# Patient Record
Sex: MALE | Race: WHITE
[De-identification: names, ages, dates, MRNs, and addresses within clinical notes are randomized per-mention and may not be internally consistent; named-entity substitution may affect disease eponyms.]

---

## 2021-08-30 ENCOUNTER — HOSPITAL ENCOUNTER (INPATIENT)
Dept: HOSPITAL 47 - EC | Age: 61
LOS: 1 days | Discharge: HOME | DRG: 694 | End: 2021-08-31
Attending: HOSPITALIST | Admitting: HOSPITALIST
Payer: MEDICARE

## 2021-08-30 DIAGNOSIS — K57.30: ICD-10-CM

## 2021-08-30 DIAGNOSIS — Z79.82: ICD-10-CM

## 2021-08-30 DIAGNOSIS — Z86.73: ICD-10-CM

## 2021-08-30 DIAGNOSIS — F17.210: ICD-10-CM

## 2021-08-30 DIAGNOSIS — Z87.442: ICD-10-CM

## 2021-08-30 DIAGNOSIS — F31.9: ICD-10-CM

## 2021-08-30 DIAGNOSIS — K80.20: ICD-10-CM

## 2021-08-30 DIAGNOSIS — N50.812: ICD-10-CM

## 2021-08-30 DIAGNOSIS — H54.61: ICD-10-CM

## 2021-08-30 DIAGNOSIS — F41.9: ICD-10-CM

## 2021-08-30 DIAGNOSIS — R31.0: ICD-10-CM

## 2021-08-30 DIAGNOSIS — E66.9: ICD-10-CM

## 2021-08-30 DIAGNOSIS — F43.10: ICD-10-CM

## 2021-08-30 DIAGNOSIS — N13.2: Primary | ICD-10-CM

## 2021-08-30 DIAGNOSIS — D72.829: ICD-10-CM

## 2021-08-30 LAB
ALBUMIN SERPL-MCNC: 4.5 G/DL (ref 3.5–5)
ALP SERPL-CCNC: 79 U/L (ref 38–126)
ALT SERPL-CCNC: 34 U/L (ref 4–49)
AMYLASE SERPL-CCNC: 45 U/L (ref 30–110)
ANION GAP SERPL CALC-SCNC: 8 MMOL/L
AST SERPL-CCNC: 42 U/L (ref 17–59)
BASOPHILS # BLD AUTO: 0 K/UL (ref 0–0.2)
BASOPHILS NFR BLD AUTO: 0 %
BUN SERPL-SCNC: 20 MG/DL (ref 9–20)
CALCIUM SPEC-MCNC: 9.9 MG/DL (ref 8.4–10.2)
CHLORIDE SERPL-SCNC: 111 MMOL/L (ref 98–107)
CO2 SERPL-SCNC: 20 MMOL/L (ref 22–30)
EOSINOPHIL # BLD AUTO: 0.1 K/UL (ref 0–0.7)
EOSINOPHIL NFR BLD AUTO: 1 %
ERYTHROCYTE [DISTWIDTH] IN BLOOD BY AUTOMATED COUNT: 5.03 M/UL (ref 4.3–5.9)
ERYTHROCYTE [DISTWIDTH] IN BLOOD: 13.9 % (ref 11.5–15.5)
GLUCOSE SERPL-MCNC: 134 MG/DL (ref 74–99)
HCT VFR BLD AUTO: 48.9 % (ref 39–53)
HGB BLD-MCNC: 16.8 GM/DL (ref 13–17.5)
LIPASE SERPL-CCNC: 46 U/L (ref 23–300)
LYMPHOCYTES # SPEC AUTO: 0.8 K/UL (ref 1–4.8)
LYMPHOCYTES NFR SPEC AUTO: 7 %
MCH RBC QN AUTO: 33.4 PG (ref 25–35)
MCHC RBC AUTO-ENTMCNC: 34.4 G/DL (ref 31–37)
MCV RBC AUTO: 97.2 FL (ref 80–100)
MONOCYTES # BLD AUTO: 0.8 K/UL (ref 0–1)
MONOCYTES NFR BLD AUTO: 6 %
NEUTROPHILS # BLD AUTO: 10.2 K/UL (ref 1.3–7.7)
NEUTROPHILS NFR BLD AUTO: 85 %
PH UR: 5.5 [PH] (ref 5–8)
PLATELET # BLD AUTO: 173 K/UL (ref 150–450)
POTASSIUM SERPL-SCNC: 4.2 MMOL/L (ref 3.5–5.1)
PROT SERPL-MCNC: 7 G/DL (ref 6.3–8.2)
RBC UR QL: 28 /HPF (ref 0–5)
SODIUM SERPL-SCNC: 139 MMOL/L (ref 137–145)
SP GR UR: 1.02 (ref 1–1.03)
SQUAMOUS UR QL AUTO: <1 /HPF (ref 0–4)
UROBILINOGEN UR QL STRIP: <2 MG/DL (ref ?–2)
WBC # BLD AUTO: 11.9 K/UL (ref 3.8–10.6)
WBC #/AREA URNS HPF: 3 /HPF (ref 0–5)

## 2021-08-30 PROCEDURE — 99285 EMERGENCY DEPT VISIT HI MDM: CPT

## 2021-08-30 PROCEDURE — 96375 TX/PRO/DX INJ NEW DRUG ADDON: CPT

## 2021-08-30 PROCEDURE — 80053 COMPREHEN METABOLIC PANEL: CPT

## 2021-08-30 PROCEDURE — 81001 URINALYSIS AUTO W/SCOPE: CPT

## 2021-08-30 PROCEDURE — 82150 ASSAY OF AMYLASE: CPT

## 2021-08-30 PROCEDURE — 96374 THER/PROPH/DIAG INJ IV PUSH: CPT

## 2021-08-30 PROCEDURE — 36415 COLL VENOUS BLD VENIPUNCTURE: CPT

## 2021-08-30 PROCEDURE — 74177 CT ABD & PELVIS W/CONTRAST: CPT

## 2021-08-30 PROCEDURE — 83690 ASSAY OF LIPASE: CPT

## 2021-08-30 PROCEDURE — 85025 COMPLETE CBC W/AUTO DIFF WBC: CPT

## 2021-08-30 NOTE — CT
EXAMINATION TYPE: CT abdomen pelvis w con

 

DATE OF EXAM: 8/30/2021

 

COMPARISON: None

 

HISTORY: Abdominal pain and nausea.

 

CT DLP: 1776.6 mGycm

Automated exposure control for dose reduction was used.

 

CONTRAST: 

Performed with IV Contrast, patient injected with 100 mL of Isovue 300.

 

The lung bases are clear of consolidation. There is no pleural effusion. Heart size is normal. There 
is no pericardial effusion. Stomach is intact. Liver and spleen and pancreas appear intact. The bile 
ducts are not dilated. There are several large calcified gallstones. Gallbladder has normal size.

 

There is no adrenal mass. Kidneys have normal size. There is left-sided hydronephrosis and delayed le
ft side pyelogram. There is mild left-sided hydroureter. There is 5 mm obstructing calculus distal le
ft ureter. Right kidney shows normal excretion on the delayed images. There is no retroperitoneal geoffrey
nopathy. Bladder distends smoothly. There is no inguinal hernia. There is no free fluid in the pelvis
. 

 

There are some sigmoid diverticula. I see no sign of diverticulitis. Appendix is lateral and posterio
r and appears normal. There is no mesenteric edema. There is no ascites or free air. There is no juliana
l obstruction. The lumbar vertebra have normal alignment. There is large posterior calcification of t
he endplates at L5-S1. There is developmentally adequate spinal canal and no significant spinal steno
sis. There is no lumbar compression fracture. The bony pelvis is intact. The hip joints are intact.

 

IMPRESSION:

Obstructing calculus lower left ureter with left-sided hydronephrosis and hydroureter. The

 

Normal appendix. Large calcified gallstones. No dilated ducts. Sigmoid diverticulosis.

## 2021-08-30 NOTE — ED
General Adult HPI





- General


Chief complaint: Abdominal Pain


Stated complaint: Male , vomiting


Time Seen by Provider: 08/30/21 16:10


Source: patient, RN notes reviewed, old records reviewed


Mode of arrival: ambulatory


Limitations: no limitations





- History of Present Illness


Initial comments: 





I evaluated the patient was placed in a room.  Workup was started in the waiting

room by nursing staff.  Patient is a 60-year-old male with past medical history 

remarkable for CVA, TIA with no residual deficits other than blindness in the 

right eye as well as a remote history of renal stones presents emergency 

Department complaining of left flank plain.  She states that this started today.

 He describes it as a sharp sensation in his left flank that radiates up to his 

left lower back as well as his left groin.  This is similar to prior episodes of

nephrolithiasis which he has not experienced in some years.  He is endorsing 

some nausea as well as nonbilious, bloody emesis.  Denies any change in bowel 

habits, and is still experiencing normal flatus.  Denies any dysuria but does 

endorse hematuria.  Denies any fevers or chills.  Denies any chest pain, s

hortness of breath.  Denies any sick contacts.  He still able to urinate.  His 

no other acute complaints at this time.





- Related Data


                                Home Medications











 Medication  Instructions  Recorded  Confirmed


 


Aspirin EC [Ecotrin Low Dose] 81 mg PO DAILY 08/30/21 08/30/21











                                    Allergies











Allergy/AdvReac Type Severity Reaction Status Date / Time


 


No Known Allergies Allergy   Verified 08/30/21 18:46














Review of Systems


ROS Statement: 


Those systems with pertinent positive or pertinent negative responses have been 

documented in the HPI.


Review of Systems:


CONST: Denies fever 


EYES: Denies blurry vision 


ENT: Denies nasal congestion  


C/V:  Denies Chest pain


RESP: Denies shortness of breath 


GI: Endorses abdominal pain/flank pain


: Denies dysuria  


SKIN: Denies rash.


MSK: Denies joint pain.


NEURO: Denies headache 


ROS Other: All systems not noted in ROS Statement are negative.





Past Medical History


Past Medical History: CVA/TIA


Past Surgical History: Orthopedic Surgery


Past Psychological History: Anxiety, Bipolar, Depression, PTSD


Smoking Status: Current every day smoker


Past Alcohol Use History: Rare


Past Drug Use History: None Reported





General Exam





- General Exam Comments


Initial Comments: 





General: Appears in no acute distress.


HEAD:  Normal with no signs of head trauma.


EYES:  PERRLA, EOMI, conjunctiva normal, no discharge.


ENT:  Hearing grossly intact, normal oropharynx.


RESPIRATORY:  Clear breath sounds bilaterally.  No wheezes, rales, or rhonchi.  


C/V:  Regular rate and rhythm. S1 and S2 auscultated, no edema, peripheral 

pulses 2+ and intact throughout


ABD: Abdomen is soft, nondistended.  Patient has left-sided flank tenderness to 

palpation with radiation to his left groin.  His no left lower quadrant 

tenderness palpation.  There is no rebound wrist peritoneal signs.  Patient does

have left CVA tenderness to percussion.


EXT: Normal range of motion, no obvious deformity


SKIN:  No rashes or lesions observed on exposed skin.


NEURO: Alert and oriented 4.


Limitations: no limitations





Course


                                   Vital Signs











  08/30/21 08/30/21 08/30/21





  14:24 18:19 20:00


 


Temperature 98.1 F 97.7 F 


 


Pulse Rate 58 L 55 L 130 H


 


Respiratory 18 18 





Rate   


 


Blood Pressure 189/84 177/76 


 


O2 Sat by Pulse 95 95 





Oximetry   














Medical Decision Making





- Medical Decision Making





Based on patient's presentation and physical exam, I'm concerned for was likely 

nephrolithiasis and possible hydronephrosis.  Laboratory studies were done by 

nursing staff revealed a mild leukocytosis of 11.9 which could be reactive.  

Patient is mildly deep bicarb at 20.  Patient's urinalysis is relatively u

nremarkable except for hematuria.  Is not impressive for infection at this time.

 Addition to the workup performed by nursing staff, did recommend that we obtain

a CT abdomen and pelvis due to his pain.  He was in agreement this plan.  Also 

be given a 1 L fluid bolus as well as IV morphine, Toradol, Zofran.  Patient was

in agreement this plan.





Patient's CT imaging revealed a 5 mm nephrolithiasis in the distal left ureter. 

There are also signs of hydronephrosis.  There is some sigmoid diverticula as 

well without diverticulitis.  There are calcified gallstones as well.  Remaining

a CT is relatively unremarkable.





On reevaluation, patient still complaining of pain as well as nausea and vomiti

ng.  I did recommend that we admit him to the hospital for pain control, nausea 

and vomiting control, as well as evaluation by urology due to his 

nephrolithiasis.  He was in agreement this plan.





I consulted urology and spoke with the attending on call, Dr. Garcia was in 

agreement with the plan.  Patient has no PCP and will therefore be admitted to 

city call, and I spoke with the mid-level provider Randal Soriano who accepted the

patient.  Patient was therefore admitted to the hospital and serous condition.





- Lab Data


Result diagrams: 


                                 08/30/21 14:32





                                 08/30/21 14:32


                                   Lab Results











  08/30/21 08/30/21 08/30/21 Range/Units





  14:32 14:32 14:32 


 


WBC  11.9 H    (3.8-10.6)  k/uL


 


RBC  5.03    (4.30-5.90)  m/uL


 


Hgb  16.8    (13.0-17.5)  gm/dL


 


Hct  48.9    (39.0-53.0)  %


 


MCV  97.2    (80.0-100.0)  fL


 


MCH  33.4    (25.0-35.0)  pg


 


MCHC  34.4    (31.0-37.0)  g/dL


 


RDW  13.9    (11.5-15.5)  %


 


Plt Count  173    (150-450)  k/uL


 


MPV  8.9    


 


Neutrophils %  85    %


 


Lymphocytes %  7    %


 


Monocytes %  6    %


 


Eosinophils %  1    %


 


Basophils %  0    %


 


Neutrophils #  10.2 H    (1.3-7.7)  k/uL


 


Lymphocytes #  0.8 L    (1.0-4.8)  k/uL


 


Monocytes #  0.8    (0-1.0)  k/uL


 


Eosinophils #  0.1    (0-0.7)  k/uL


 


Basophils #  0.0    (0-0.2)  k/uL


 


Sodium    139  (137-145)  mmol/L


 


Potassium    4.2  (3.5-5.1)  mmol/L


 


Chloride    111 H  ()  mmol/L


 


Carbon Dioxide    20 L  (22-30)  mmol/L


 


Anion Gap    8  mmol/L


 


BUN    20  (9-20)  mg/dL


 


Creatinine    1.16  (0.66-1.25)  mg/dL


 


Est GFR (CKD-EPI)AfAm    79  (>60 ml/min/1.73 sqM)  


 


Est GFR (CKD-EPI)NonAf    68  (>60 ml/min/1.73 sqM)  


 


Glucose    134 H  (74-99)  mg/dL


 


Calcium    9.9  (8.4-10.2)  mg/dL


 


Total Bilirubin    0.5  (0.2-1.3)  mg/dL


 


AST    42  (17-59)  U/L


 


ALT    34  (4-49)  U/L


 


Alkaline Phosphatase    79  ()  U/L


 


Total Protein    7.0  (6.3-8.2)  g/dL


 


Albumin    4.5  (3.5-5.0)  g/dL


 


Amylase    45  ()  U/L


 


Lipase    46  ()  U/L


 


Urine Color   Yellow   


 


Urine Appearance   Cloudy   (Clear)  


 


Urine pH   5.5   (5.0-8.0)  


 


Ur Specific Gravity   1.020   (1.001-1.035)  


 


Urine Protein   Trace H   (Negative)  


 


Urine Glucose (UA)   Negative   (Negative)  


 


Urine Ketones   Trace H   (Negative)  


 


Urine Blood   Large H   (Negative)  


 


Urine Nitrite   Negative   (Negative)  


 


Urine Bilirubin   Negative   (Negative)  


 


Urine Urobilinogen   <2.0   (<2.0)  mg/dL


 


Ur Leukocyte Esterase   Negative   (Negative)  


 


Urine RBC   28 H   (0-5)  /hpf


 


Urine WBC   3   (0-5)  /hpf


 


Ur Squamous Epith Cells   <1   (0-4)  /hpf


 


Urine Bacteria   Rare H   (None)  /hpf


 


Urine Mucus   Rare H   (None)  /hpf














Disposition


Clinical Impression: 


 Nephrolithiasis, Hydronephrosis, Abdominal pain, Nausea and vomiting





Disposition: ADMITTED AS IP TO THIS [facility]


Condition: Serious

## 2021-08-31 VITALS
RESPIRATION RATE: 18 BRPM | DIASTOLIC BLOOD PRESSURE: 65 MMHG | SYSTOLIC BLOOD PRESSURE: 108 MMHG | HEART RATE: 56 BPM | TEMPERATURE: 97.7 F

## 2021-08-31 RX ADMIN — HEPARIN SODIUM SCH: 5000 INJECTION INTRAVENOUS; SUBCUTANEOUS at 03:20

## 2021-08-31 RX ADMIN — HEPARIN SODIUM SCH: 5000 INJECTION INTRAVENOUS; SUBCUTANEOUS at 08:51

## 2021-08-31 NOTE — P.GSCN
History of Present Illness


Consult date: 08/31/21


Reason for Consult: 


Left renal colic





Requesting physician: Malcolm Harris


History of present illness: 


The patient is a 60-year-old white male who presented to the ER yesterday with 

acute onset of left flank pain radiating to the left groin.  He experienced 

associated nausea, vomiting, and gross hematuria.  A CT scan showed mild left 

hydroureteronephrosis due to a 5 mm left distal ureteral calculus.  He was 

admitted for treatment consisting of IV hydration and parenteral analgesics.  He

is currently comfortable.








Review of Systems





- Constitutional


Denies chills, Denies fever





- Gastrointestinal


Reports nausea, Reports vomiting





- Genitourinary


Reports flank pain, Reports hematuria, Reports kidney stones





Past Medical History


Past Medical History: CVA/TIA


History of Any Multi-Drug Resistant Organisms: None Reported


Past Surgical History: Orthopedic Surgery


Additional Past Surgical History / Comment(s): shoulder


Past Psychological History: Anxiety, Bipolar, Depression, PTSD


Smoking Status: Current every day smoker


Past Alcohol Use History: Rare


Past Drug Use History: None Reported





Medications and Allergies


                                Home Medications











 Medication  Instructions  Recorded  Confirmed  Type


 


Aspirin EC [Ecotrin Low Dose] 81 mg PO DAILY 08/30/21 08/30/21 History


 


Acetaminophen Tab [Tylenol Tab] 500 mg PO Q6H PRN #30 tablet 08/31/21  Rx


 


HYDROcodone/APAP 5-325MG [Norco 1 tab PO Q6HR PRN #10 tab 08/31/21  Rx





5-325]    


 


Ondansetron [Zofran] 4 mg PO Q8HR PRN #12 tab 08/31/21  Rx


 


Tamsulosin [Flomax] 0.4 mg PO PC-BRKFST #30 cap.er.24h 08/31/21  Rx








                                    Allergies











Allergy/AdvReac Type Severity Reaction Status Date / Time


 


No Known Allergies Allergy   Verified 08/30/21 18:46














Surgical - Exam


                                   Vital Signs











Temp Pulse Resp BP Pulse Ox


 


 98.1 F   58 L  18   189/84   95 


 


 08/30/21 14:24  08/30/21 14:24  08/30/21 14:24  08/30/21 14:24  08/30/21 14:24














- General


well developed, well nourished, no distress





- Respiratory


normal respiratory effort





- Abdomen


Abdomen: soft, non tender, no guarding, no rigid, no rebound





- Genitourinary


normal penis with no external lesions, testicles non-tender





- Psychiatric


oriented to time, oriented to person, oriented to place, speech is normal, 

memory intact





Results





- Labs





                                 08/30/21 14:32





                                 08/30/21 14:32


                  Abnormal Lab Results - Last 24 Hours (Table)











  08/30/21 08/30/21 08/30/21 Range/Units





  14:32 14:32 14:32 


 


WBC  11.9 H    (3.8-10.6)  k/uL


 


Neutrophils #  10.2 H    (1.3-7.7)  k/uL


 


Lymphocytes #  0.8 L    (1.0-4.8)  k/uL


 


Chloride    111 H  ()  mmol/L


 


Carbon Dioxide    20 L  (22-30)  mmol/L


 


Glucose    134 H  (74-99)  mg/dL


 


Urine Protein   Trace H   (Negative)  


 


Urine Ketones   Trace H   (Negative)  


 


Urine Blood   Large H   (Negative)  


 


Urine RBC   28 H   (0-5)  /hpf


 


Urine Bacteria   Rare H   (None)  /hpf


 


Urine Mucus   Rare H   (None)  /hpf








                                 Diabetes panel











  08/30/21 Range/Units





  14:32 


 


Sodium  139  (137-145)  mmol/L


 


Potassium  4.2  (3.5-5.1)  mmol/L


 


Chloride  111 H  ()  mmol/L


 


Carbon Dioxide  20 L  (22-30)  mmol/L


 


BUN  20  (9-20)  mg/dL


 


Creatinine  1.16  (0.66-1.25)  mg/dL


 


Glucose  134 H  (74-99)  mg/dL


 


Calcium  9.9  (8.4-10.2)  mg/dL


 


AST  42  (17-59)  U/L


 


ALT  34  (4-49)  U/L


 


Alkaline Phosphatase  79  ()  U/L


 


Total Protein  7.0  (6.3-8.2)  g/dL


 


Albumin  4.5  (3.5-5.0)  g/dL








                                  Calcium panel











  08/30/21 Range/Units





  14:32 


 


Calcium  9.9  (8.4-10.2)  mg/dL


 


Albumin  4.5  (3.5-5.0)  g/dL








                                 Pituitary panel











  08/30/21 Range/Units





  14:32 


 


Sodium  139  (137-145)  mmol/L


 


Potassium  4.2  (3.5-5.1)  mmol/L


 


Chloride  111 H  ()  mmol/L


 


Carbon Dioxide  20 L  (22-30)  mmol/L


 


BUN  20  (9-20)  mg/dL


 


Creatinine  1.16  (0.66-1.25)  mg/dL


 


Glucose  134 H  (74-99)  mg/dL


 


Calcium  9.9  (8.4-10.2)  mg/dL








                                  Adrenal panel











  08/30/21 Range/Units





  14:32 


 


Sodium  139  (137-145)  mmol/L


 


Potassium  4.2  (3.5-5.1)  mmol/L


 


Chloride  111 H  ()  mmol/L


 


Carbon Dioxide  20 L  (22-30)  mmol/L


 


BUN  20  (9-20)  mg/dL


 


Creatinine  1.16  (0.66-1.25)  mg/dL


 


Glucose  134 H  (74-99)  mg/dL


 


Calcium  9.9  (8.4-10.2)  mg/dL


 


Total Bilirubin  0.5  (0.2-1.3)  mg/dL


 


AST  42  (17-59)  U/L


 


ALT  34  (4-49)  U/L


 


Alkaline Phosphatase  79  ()  U/L


 


Total Protein  7.0  (6.3-8.2)  g/dL


 


Albumin  4.5  (3.5-5.0)  g/dL














- Imaging


CT scan - abdomen: report reviewed, image reviewed





Assessment and Plan


(1) Calculus of ureter


Current Visit: Yes   Status: Acute   Code(s): N20.1 - CALCULUS OF URETER   

SNOMED Code(s): 12607865


   





(2) Hydronephrosis with renal and ureteral calculous obstruction


Current Visit: Yes   Status: Acute   Code(s): N13.2 - HYDRONEPHROSIS WITH RENAL 

AND URETERAL CALCULOUS OBSTRUCTION   SNOMED Code(s): 944349572


   


Plan: 


In summary, the patient is a 60-year-old white male admitted with left renal 

colic due to a 5 mm left distal ureteral calculus.  His pain is currently 

controlled.   He passed some blood yesterday evening after being admitted.  His 

urine has not been strained, and therefore it is unclear whether he may or may 

not have passed the calculus.  In any case, his pain is currently absent and he 

wishes to be discharged home.  I have advised him to continue to strain his 

urine, and take analgesics as needed.  We discussed surgical treatment options, 

though I explained to him that there is a high likelihood he will pass the 

calculus.  He will follow-up with me in 2 weeks, sooner if needed.  





Time with Patient: Greater than 30

## 2021-08-31 NOTE — HP
HISTORY AND PHYSICAL



This is a combined history and physical and discharge summary.



DATE OF SERVICE:

08/31/2021



CHIEF COMPLAINTS:

Left flank pain and left testicular pain.



HISTORY OF PRESENT ILLNESS:

This 60-year-old gentleman with a past medical history of multiple medical problems,

including CVA, TIA, history of DJD, history of anxiety, bipolar, depression, PTSD,

being followed by Dr. Lorenzo in the outpatient setting, was  complaining of left flank

pain, left lower abdominal pain as well as left testicular pain.  Evaluation in the ER

showed WBC 11.9 and some mild hematuria.  The patient also had a CT scan of the abdomen

and pelvis which shows obstructing calculus in the lower left ureter and left-sided

hydronephrosis and hydroureter.  Dr. Garcia has evaluated the patient and recommended

Flomax and outpatient followup also.  The patient is keen to go home.  There is no

history of any fever, rigors or chills. No history of headache, loss of consciousness,

seizures. No history of dysuria at this time. No history of chest pain or palpitations.



PAST MEDICAL HISTORY:

History of CVA, TIA, anxiety, bipolar, depression, PTSD.



MEDICATIONS:

Home medications are aspirin, Flomax, Zofran, Norco, Tylenol.



ALLERGIES:

NONE.



FAMILY HISTORY:

No history of heart disease or strokes in the family.



SOCIAL HISTORY:

History of smoking on a daily basis.



REVIEW OF SYSTEMS:

ENT:  No diminished hearing. No diminished vision.

CARDIOVASCULAR SYSTEM: No angina, palpitations.

RESPIRATORY SYSTEM: No cough, hemoptysis.

GI: As mentioned earlier.

:  As mentioned earlier.

NERVOUS SYSTEM: No numbness, weakness.

ALLERGY/IMMUNOLOGY:  No asthma or hay fever.

MUSCULOSKELETAL: As mentioned earlier.

HEMATOLOGY/ONCOLOGY:  No history of anemia.

ENDOCRINE: No history of diabetes, hypothyroidism.

CONSTITUTIONAL: As mentioned earlier.

DERMATOLOGY:  Negative.

RHEUMATOLOGY: Negative.

PSYCHIATRY: As mentioned earlier.



PHYSICAL EXAMINATION:

Patient alert and oriented x3. Pulse 56, blood pressure 108/65, respiration 18,

temperature 97.7, pulse ox 97% on room air.

HEENT: Conjunctivae normal.

NECK: No jugular venous distention.

CARDIOVASCULAR: S1, S2 muffled.

RESPIRATION: Breath sounds diminished at the bases.  No rhonchi. No crackles.

ABDOMEN: Soft. No tenderness. No guarding.  No rigidity.  No mass palpable.

LEGS: No edema. No swelling.

NERVOUS SYSTEM: Higher functions as mentioned earlier. Moves all 4 limbs.  No focal

motor or sensory deficit.

LYMPHATICS: No lymph node palpable in neck, axillae or groin.

SKIN: No ulcer, rash, bleeding.

JOINTS: No active deforming arthropathy.



LABS:

WBC 11.9, sodium 136, potassium 4.2. Glucose 134.  UA noted.



ASSESSMENT:

1. Left-sided ureterolithiasis with severe abdominal pain, improved.

2. Obstructing calculus in the lower left ureter with left-sided hydronephrosis and

    hydroureter.

3. Increased white count, possibly reactive.

4. Increased random blood glucose.

5. History of cerebrovascular accident, transient ischemic attack.

6. History of anxiety, bipolar, depression, posttraumatic stress disorder.

7. History of nicotine dependence.

8. Obesity with body mass index of 34.5.

9. FULL CODE.



RECOMMENDATIONS AND DISCUSSION:

In this 60-year-old gentleman who presented with multiple medical problems, at this

time the patient is symptomatically better.  Urology evaluated the patient and

recommended outpatient followup.  The patient is keen on going home. The patient will

be discharged in stable condition and guarded prognosis.



DISCHARGE ADVICE AND MEDICATIONS:

1. Diet is cardiac.

2. Activity limited until followup.

3. Followup with Dr. Jimmy Lorenzo in 2-3 days with CBC, BMP.

4. Follow up with Dr. Garcia, Urology, as recommended.

5. Ecotrin 81 mg p.o. daily.

6. Flomax 0.4 daily.

7. Norco 5 mg q.6 p.r.n.

8. Tylenol p.r.n.

9. Zofran 4 mg q.8 p.r.n. for nausea.





MMODL / IJN: [id number] / Job#: [id number]

## 2023-06-25 ENCOUNTER — HOSPITAL ENCOUNTER (INPATIENT)
Dept: HOSPITAL 47 - EC | Age: 63
LOS: 8 days | Discharge: HOME | DRG: 885 | End: 2023-07-03
Attending: PSYCHIATRY & NEUROLOGY | Admitting: PSYCHIATRY & NEUROLOGY
Payer: MEDICARE

## 2023-06-25 DIAGNOSIS — Z91.148: ICD-10-CM

## 2023-06-25 DIAGNOSIS — Z86.73: ICD-10-CM

## 2023-06-25 DIAGNOSIS — F17.210: ICD-10-CM

## 2023-06-25 DIAGNOSIS — E86.0: ICD-10-CM

## 2023-06-25 DIAGNOSIS — Z73.3: ICD-10-CM

## 2023-06-25 DIAGNOSIS — F31.2: Primary | ICD-10-CM

## 2023-06-25 DIAGNOSIS — F43.10: ICD-10-CM

## 2023-06-25 DIAGNOSIS — F12.10: ICD-10-CM

## 2023-06-25 DIAGNOSIS — Z20.822: ICD-10-CM

## 2023-06-25 DIAGNOSIS — R45.1: ICD-10-CM

## 2023-06-25 DIAGNOSIS — Z91.49: ICD-10-CM

## 2023-06-25 DIAGNOSIS — F41.9: ICD-10-CM

## 2023-06-25 DIAGNOSIS — F23: ICD-10-CM

## 2023-06-25 DIAGNOSIS — Z28.310: ICD-10-CM

## 2023-06-25 DIAGNOSIS — R45.851: ICD-10-CM

## 2023-06-25 DIAGNOSIS — Z73.0: ICD-10-CM

## 2023-06-25 LAB
ANION GAP SERPL CALC-SCNC: 9 MMOL/L
BASOPHILS # BLD AUTO: 0 K/UL (ref 0–0.2)
BASOPHILS NFR BLD AUTO: 0 %
BUN SERPL-SCNC: 17 MG/DL (ref 9–20)
CALCIUM SPEC-MCNC: 8.9 MG/DL (ref 8.4–10.2)
CHLORIDE SERPL-SCNC: 107 MMOL/L (ref 98–107)
CO2 SERPL-SCNC: 21 MMOL/L (ref 22–30)
EOSINOPHIL # BLD AUTO: 0.1 K/UL (ref 0–0.7)
EOSINOPHIL NFR BLD AUTO: 1 %
ERYTHROCYTE [DISTWIDTH] IN BLOOD BY AUTOMATED COUNT: 4.92 M/UL (ref 4.3–5.9)
ERYTHROCYTE [DISTWIDTH] IN BLOOD: 12.9 % (ref 11.5–15.5)
GLUCOSE SERPL-MCNC: 107 MG/DL (ref 74–99)
HCT VFR BLD AUTO: 47.1 % (ref 39–53)
HGB BLD-MCNC: 16 GM/DL (ref 13–17.5)
LYMPHOCYTES # SPEC AUTO: 1.4 K/UL (ref 1–4.8)
LYMPHOCYTES NFR SPEC AUTO: 22 %
MCH RBC QN AUTO: 32.6 PG (ref 25–35)
MCHC RBC AUTO-ENTMCNC: 34 G/DL (ref 31–37)
MCV RBC AUTO: 95.7 FL (ref 80–100)
MONOCYTES # BLD AUTO: 0.5 K/UL (ref 0–1)
MONOCYTES NFR BLD AUTO: 8 %
NEUTROPHILS # BLD AUTO: 4.4 K/UL (ref 1.3–7.7)
NEUTROPHILS NFR BLD AUTO: 67 %
PLATELET # BLD AUTO: 187 K/UL (ref 150–450)
POTASSIUM SERPL-SCNC: 3.8 MMOL/L (ref 3.5–5.1)
SODIUM SERPL-SCNC: 137 MMOL/L (ref 137–145)
WBC # BLD AUTO: 6.5 K/UL (ref 3.8–10.6)

## 2023-06-25 PROCEDURE — 36415 COLL VENOUS BLD VENIPUNCTURE: CPT

## 2023-06-25 PROCEDURE — 87635 SARS-COV-2 COVID-19 AMP PRB: CPT

## 2023-06-25 PROCEDURE — 81003 URINALYSIS AUTO W/O SCOPE: CPT

## 2023-06-25 PROCEDURE — 82075 ASSAY OF BREATH ETHANOL: CPT

## 2023-06-25 PROCEDURE — 80076 HEPATIC FUNCTION PANEL: CPT

## 2023-06-25 PROCEDURE — 84443 ASSAY THYROID STIM HORMONE: CPT

## 2023-06-25 PROCEDURE — 93005 ELECTROCARDIOGRAM TRACING: CPT

## 2023-06-25 PROCEDURE — 96372 THER/PROPH/DIAG INJ SC/IM: CPT

## 2023-06-25 PROCEDURE — 80048 BASIC METABOLIC PNL TOTAL CA: CPT

## 2023-06-25 PROCEDURE — 80306 DRUG TEST PRSMV INSTRMNT: CPT

## 2023-06-25 PROCEDURE — 85025 COMPLETE CBC W/AUTO DIFF WBC: CPT

## 2023-06-25 PROCEDURE — 99285 EMERGENCY DEPT VISIT HI MDM: CPT

## 2023-06-25 PROCEDURE — 80061 LIPID PANEL: CPT

## 2023-06-25 PROCEDURE — 83036 HEMOGLOBIN GLYCOSYLATED A1C: CPT

## 2023-06-25 PROCEDURE — 80320 DRUG SCREEN QUANTALCOHOLS: CPT

## 2023-06-25 NOTE — ED
General Adult HPI





- General


Source: patient, police, RN notes reviewed, old records reviewed


Mode of arrival: wheelchair





<Ranjith Arango - Last Filed: 06/25/23 20:05>





<Tien Arshad - Last Filed: 06/26/23 00:52>





- General


Chief complaint: Psychiatric Symptoms


Stated complaint: Mental Health


Time Seen by Provider: 06/25/23 17:44





- History of Present Illness


Initial comments: 





Patient is a 62-year-old male who presents emergency Department complaining of 

psychiatric evaluation.  Patient was petitioned this patient has flight of ideas

, pressured speech as well as conveying suicidal ideation.  Patient denies 

anything to me, however he does have pressured speech throughout the 

conversation.  Has no acute complaints.  Does have a history of eye issues.  His

no other acute distress at this time.  Presents for further evaluation.  Denies 

hallucinations.  Denies homicidal ideations.  No other significant past medical 

history.  Presents for further evaluation.  Patient's petition states that he is

manic, is threatening tissue himself in the head with a gun.  Does have access 

to guns.  No attempts. (Ranjith Arango)





- Related Data


                                Home Medications











 Medication  Instructions  Recorded  Confirmed


 


ALPRAZolam [Xanax] 0.25 mg PO TID PRN 06/25/23 06/26/23











                                    Allergies











Allergy/AdvReac Type Severity Reaction Status Date / Time


 


No Known Allergies Allergy   Verified 06/26/23 00:10














Review of Systems


ROS Other: All systems not noted in ROS Statement are negative.





<Ranjith Arango - Last Filed: 06/25/23 20:05>


ROS Other: All systems not noted in ROS Statement are negative.





<Tien Arshad - Last Filed: 06/26/23 00:52>


ROS Statement: 


Those systems with pertinent positive or pertinent negative responses have been 

documented in the HPI.


Review of Systems:


CONST: Denies fever 


EYES: Denies blurry vision 


ENT: Denies nasal congestion  


C/V:  Denies Chest pain


RESP: Denies shortness of breath 


GI: Denies abdominal pain 


: Denies dysuria  


SKIN: Denies rash.


MSK: Denies joint pain.


NEURO: Denies headache 


PSYCH: Denies suicidal and homicidal ideations/plans/attempts.  Denies visual or

 auditory hallucinations. (Ranjith Arango)





Past Medical History


Past Medical History: CVA/TIA


History of Any Multi-Drug Resistant Organisms: None Reported


Past Surgical History: Orthopedic Surgery


Additional Past Surgical History / Comment(s): shoulder


Past Psychological History: Anxiety, Bipolar, Depression, PTSD


Smoking Status: Current every day smoker


Past Alcohol Use History: Rare


Past Drug Use History: None Reported





<Ranjith Arango - Last Filed: 06/25/23 20:05>





General Exam





<Ranjith Arango - Last Filed: 06/25/23 20:05>





- General Exam Comments


Initial Comments: 





General: Appears in no acute distress.  Patient appears manic, has pressured 

speech as well as flight of ideas.


HEAD:  Normal with no signs of head trauma.


EYES: EOMI


ENT:  Hearing grossly intact, normal oropharynx.


RESPIRATORY:  Clear breath sounds bilaterally.  No wheezes, rales, or rhonchi.  


C/V:  Regular rate and rhythm. S1 and S2 auscultated, peripheral pulses 2+ and 

intact throughout


ABD:  Abd is soft, nontender, nondistended


EXT: Normal range of motion, no obvious deformity


SKIN:  No rashes or lesions observed on exposed skin.


NEURO: Alert and oriented 4.  No focal deficits. (Ranjith Arango)





Course





                                   Vital Signs











  06/25/23





  17:27


 


Temperature 98 F


 


Pulse Rate 96


 


Respiratory 18





Rate 


 


Blood Pressure 161/92


 


O2 Sat by Pulse 96





Oximetry 














Medical Decision Making





- Lab Data


Result diagrams: 


                                 06/25/23 18:20





                                 06/25/23 18:20





- EKG Data


-: EKG Interpreted by Me





<Ranjith Arango - Last Filed: 06/25/23 20:05>





- Lab Data


Result diagrams: 


                                 06/25/23 18:20





                                 06/25/23 18:20





<Tien Arshad - Last Filed: 06/26/23 00:52>





- Medical Decision Making





Was pt. sent in by a medical professional or institution (, PA, NP, urgent 

care, hospital, or nursing home...) When possible be specific


@  -No


Did you speak to anyone other than the patient for history (EMS, parent, family,

 police, friend...)? What history was obtained from this source 


@  -No


Did you review nursing and triage notes (agree or disagree)?  Why? 


@  -I reviewed and agree with nursing and triage notes


Were old charts reviewed (outside hosp., previous admission, EMS record, old 

EKG, old radiological studies, urgent care reports/EKG's, nursing home records)?

 Report findings 


@  -I reviewed the patient's petition.


Differential Diagnosis (chest pain, altered mental status, abdominal pain women,

 abdominal pain men, vaginal bleeding, weakness, fever, dyspnea, syncope, 

headache, dizziness, GI bleed, back pain, seizure, CVA, palpatations, mental 

health, musculoskeletal)? 


@  -Differential Mental Health


Depression, anxiety, bipolar, psychosis, schizophrenia, borderline personality, 

situational depression, adjustment disorder, behavioral disorder, brain tumor, 

malingering, substance abuse, encephalopathy, medication reaction, dementia, 

hypothyroidism, degenerative neurologic disorder, lupus.... This is not meant to

 be all-inclusive list


EKG interpreted by me (3pts min.).


@  -As above


X-rays interpreted by me (1pt min.).


@  -None done


CT interpreted by me (1pt min.).


@  -None done


U/S interpreted by me (1pt. min.).


@  -None done


What testing was considered but not performed or refused? (CT, X-rays, U/S, 

labs)? Why?


@  -None


What meds were considered but not given or refused? Why?


@  -None


Did you discuss the management of the patient with other professionals 

(professionals i.e. , PA, NP, lab, RT, psych nurse, , , 

teacher, , )? Give summary


@  -EPS notified patient is medically cleared for evaluation.


Was smoking cessation discussed for >3mins.?


@  -No


Was critical care preformed (if so, how long)?


@  -No


Were there social determinants of health that impacted care today? How? 

(Homelessness, low income, unemployed, alcoholism, drug addiction, 

transportation, low edu. Level, literacy, decrease access to med. care, long-term, 

rehab)?


@  -No


Was there de-escalation of care discussed even if they declined (Discuss DNR or 

withdrawal of care, Hospice)? DNR status


@  -No


What co-morbidities impacted this encounter? (DM, HTN, Smoking, COPD, CAD, 

Cancer, CVA, ARF, Chemo, Hep., AIDS, mental health diagnosis, sleep apnea, 

morbid obesity)?


@  -None


Was patient admitted / discharged? Hospital course, mention meds given and 

route, prescriptions, significant lab abnormalities, going to OR and other 

pertinent info.


@  -Based on the patient's presentation, physical exam, as well as petition I do

 believe he requires psychiatric evaluation.  Due to his age we will obtain 

screening labs.  Screening EKG will also be obtained.  He was in agreement this 

plan.  Vital signs within acceptable limits.  He has obvious pressured speech 

and I'm concerned for an acute manic episode for acute psychotic episode.





Labs are within acceptable limits.  EKG unremarkable.  Placed in green scrubs.  

Alcohol level is 0.  Suicide precautions place.  Severe was ordered.





Patient is medically cleared psychiatric evaluation at this time.  Disposition 

is per minute psychiatric evaluation.  EPS was notified.


Undiagnosed new problem with uncertain prognosis?


@  -No


Drug Therapy requiring intensive monitoring for toxicity (Heparin, Nitro, 

Insulin, Cardizem)?


@  -No


Were any procedures done?


@  -No


Diagnosis/symptom?


@  -Acute psychosis, manic, but of ideas with pressured speech, suicidal 

ideations


Acute, or Chronic, or Acute on Chronic?


@  -Acute


Uncomplicated (without systemic symptoms) or Complicated (systemic symptoms)?


@  -Complicated


Side effects of treatment?


@  -No


Exacerbation, Progression, or Severe Exacerbation?


@  -No


Poses a threat to life or bodily function? How? (Chest pain, USA, MI, pneumonia,

 PE, COPD, DKA, ARF, appy, cholecystitis, CVA, Diverticulitis, Homicidal, 

Suicidal, threat to staff... and all critical care pts)


@  -yes (Ranjith Arango)


The patient was sent out to me from Dr. Arango.  The patient was signed out 

pending evaluation by EPS.  The patient was evaluated by EPS the bedside and did

 recommend inpatient admission.  I did fill out a clinical certificate for the 

patient and the patient did continue to remain stable.  The patient was admitted

 in stable condition. (Tien Arshad)





- Lab Data





                                   Lab Results











  06/25/23 06/25/23 06/25/23 Range/Units





  18:20 18:20 19:32 


 


WBC  6.5    (3.8-10.6)  k/uL


 


RBC  4.92    (4.30-5.90)  m/uL


 


Hgb  16.0    (13.0-17.5)  gm/dL


 


Hct  47.1    (39.0-53.0)  %


 


MCV  95.7    (80.0-100.0)  fL


 


MCH  32.6    (25.0-35.0)  pg


 


MCHC  34.0    (31.0-37.0)  g/dL


 


RDW  12.9    (11.5-15.5)  %


 


Plt Count  187    (150-450)  k/uL


 


MPV  7.9    


 


Neutrophils %  67    %


 


Lymphocytes %  22    %


 


Monocytes %  8    %


 


Eosinophils %  1    %


 


Basophils %  0    %


 


Neutrophils #  4.4    (1.3-7.7)  k/uL


 


Lymphocytes #  1.4    (1.0-4.8)  k/uL


 


Monocytes #  0.5    (0-1.0)  k/uL


 


Eosinophils #  0.1    (0-0.7)  k/uL


 


Basophils #  0.0    (0-0.2)  k/uL


 


Sodium   137   (137-145)  mmol/L


 


Potassium   3.8   (3.5-5.1)  mmol/L


 


Chloride   107   ()  mmol/L


 


Carbon Dioxide   21 L   (22-30)  mmol/L


 


Anion Gap   9   mmol/L


 


BUN   17   (9-20)  mg/dL


 


Creatinine   0.88   (0.66-1.25)  mg/dL


 


Est GFR (CKD-EPI)AfAm   >90   (>60 ml/min/1.73 sqM)  


 


Est GFR (CKD-EPI)NonAf   >90   (>60 ml/min/1.73 sqM)  


 


Glucose   107 H   (74-99)  mg/dL


 


Calcium   8.9   (8.4-10.2)  mg/dL


 


Urine Color     


 


Urine Appearance     (Clear)  


 


Urine pH     (5.0-8.0)  


 


Ur Specific Gravity     (1.001-1.035)  


 


Urine Protein     (Negative)  


 


Urine Glucose (UA)     (Negative)  


 


Urine Ketones     (Negative)  


 


Urine Blood     (Negative)  


 


Urine Nitrite     (Negative)  


 


Urine Bilirubin     (Negative)  


 


Urine Urobilinogen     (<2.0)  mg/dL


 


Ur Leukocyte Esterase     (Negative)  


 


Urine Opiates Screen     (NotDetected)  


 


Ur Oxycodone Screen     (NotDetected)  


 


Urine Methadone Screen     (NotDetected)  


 


Ur Propoxyphene Screen     (NotDetected)  


 


Ur Barbiturates Screen     (NotDetected)  


 


U Tricyclic Antidepress     (NotDetected)  


 


Ur Phencyclidine Scrn     (NotDetected)  


 


Ur Amphetamines Screen     (NotDetected)  


 


U Methamphetamines Scrn     (NotDetected)  


 


U Benzodiazepines Scrn     (NotDetected)  


 


Urine Cocaine Screen     (NotDetected)  


 


U Marijuana (THC) Screen     (NotDetected)  


 


Serum Alcohol   <10   mg/dL


 


Coronavirus (PCR)    Not Detected  (Not Detectd)  














  06/25/23 06/25/23 Range/Units





  20:00 20:00 


 


WBC    (3.8-10.6)  k/uL


 


RBC    (4.30-5.90)  m/uL


 


Hgb    (13.0-17.5)  gm/dL


 


Hct    (39.0-53.0)  %


 


MCV    (80.0-100.0)  fL


 


MCH    (25.0-35.0)  pg


 


MCHC    (31.0-37.0)  g/dL


 


RDW    (11.5-15.5)  %


 


Plt Count    (150-450)  k/uL


 


MPV    


 


Neutrophils %    %


 


Lymphocytes %    %


 


Monocytes %    %


 


Eosinophils %    %


 


Basophils %    %


 


Neutrophils #    (1.3-7.7)  k/uL


 


Lymphocytes #    (1.0-4.8)  k/uL


 


Monocytes #    (0-1.0)  k/uL


 


Eosinophils #    (0-0.7)  k/uL


 


Basophils #    (0-0.2)  k/uL


 


Sodium    (137-145)  mmol/L


 


Potassium    (3.5-5.1)  mmol/L


 


Chloride    ()  mmol/L


 


Carbon Dioxide    (22-30)  mmol/L


 


Anion Gap    mmol/L


 


BUN    (9-20)  mg/dL


 


Creatinine    (0.66-1.25)  mg/dL


 


Est GFR (CKD-EPI)AfAm    (>60 ml/min/1.73 sqM)  


 


Est GFR (CKD-EPI)NonAf    (>60 ml/min/1.73 sqM)  


 


Glucose    (74-99)  mg/dL


 


Calcium    (8.4-10.2)  mg/dL


 


Urine Color   Yellow  


 


Urine Appearance   Clear  (Clear)  


 


Urine pH   5.0  (5.0-8.0)  


 


Ur Specific Gravity   1.025  (1.001-1.035)  


 


Urine Protein   Trace H  (Negative)  


 


Urine Glucose (UA)   Negative  (Negative)  


 


Urine Ketones   1+ H  (Negative)  


 


Urine Blood   Negative  (Negative)  


 


Urine Nitrite   Negative  (Negative)  


 


Urine Bilirubin   Negative  (Negative)  


 


Urine Urobilinogen   <2.0  (<2.0)  mg/dL


 


Ur Leukocyte Esterase   Negative  (Negative)  


 


Urine Opiates Screen  Not Detected   (NotDetected)  


 


Ur Oxycodone Screen  Not Detected   (NotDetected)  


 


Urine Methadone Screen  Not Detected   (NotDetected)  


 


Ur Propoxyphene Screen  Not Detected   (NotDetected)  


 


Ur Barbiturates Screen  Not Detected   (NotDetected)  


 


U Tricyclic Antidepress  Not Detected   (NotDetected)  


 


Ur Phencyclidine Scrn  Not Detected   (NotDetected)  


 


Ur Amphetamines Screen  Not Detected   (NotDetected)  


 


U Methamphetamines Scrn  Not Detected   (NotDetected)  


 


U Benzodiazepines Scrn  Detected H   (NotDetected)  


 


Urine Cocaine Screen  Not Detected   (NotDetected)  


 


U Marijuana (THC) Screen  Detected H   (NotDetected)  


 


Serum Alcohol    mg/dL


 


Coronavirus (PCR)    (Not Detectd)  














- EKG Data


EKG Comments: 





12-lead Electrocardiogram Interpretation Note





EKG was reviewed and interpreted by myself. 12-lead ECG performed at 1922 is 

interpreted by me as revealing normal sinus rhythm at a rate of 76 beats per 

minute.  Right axis deviation.  NH interval is 165 ms, QRS duration is 126 ms, 

QTc is 434 ms.  When isolated T-wave inversion in lead III..  There were no 

obvious ST or T wave abnormalities to suggest myocardial ischemia or injury. R 

wave progression across the precordium was satisfactory. By my interpretation 

this EKG is non-diagnostic for acute ischemia. (Ranjith Arango)





Disposition





<Ranjith Arango - Last Filed: 06/25/23 20:05>


Is patient prescribed a controlled substance at d/c from ED?: No


Time of Disposition: 00:05


Decision to Admit Reason: Admit from EC


Decision Date: 06/26/23


Decision Time: 00:05





<Tien Arshad - Last Filed: 06/26/23 00:52>


Clinical Impression: 


 Suicidal ideation, Acute psychosis, Gaye





Disposition: ADMITTED AS IP TO THIS HOSP


Condition: Stable

## 2023-06-26 LAB
ALBUMIN SERPL-MCNC: 4.3 G/DL (ref 3.5–5)
ALP SERPL-CCNC: 81 U/L (ref 38–126)
ALT SERPL-CCNC: 34 U/L (ref 4–49)
AST SERPL-CCNC: 43 U/L (ref 17–59)
BILIRUB INDIRECT SERPL-MCNC: 1 MG/DL (ref 0–1.1)
BILIRUBIN DIRECT+TOT PNL SERPL-MCNC: 0.3 MG/DL (ref 0–0.2)
CHOLEST SERPL-MCNC: 161 MG/DL (ref 0–200)
HDLC SERPL-MCNC: 48.5 MG/DL (ref 40–60)
KETONES UR QL STRIP.AUTO: (no result)
LDLC SERPL CALC-MCNC: 97 MG/DL (ref 0–131)
PH UR: 5 [PH] (ref 5–8)
PROT SERPL-MCNC: 7.1 G/DL (ref 6.3–8.2)
SP GR UR: 1.02 (ref 1–1.03)
TRIGL SERPL-MCNC: 77.4 MG/DL (ref 0–149)
UROBILINOGEN UR QL STRIP: <2 MG/DL (ref ?–2)
VLDLC SERPL CALC-MCNC: 15.48 MG/DL (ref 5–40)

## 2023-06-26 RX ADMIN — DIVALPROEX SODIUM SCH: 125 CAPSULE, COATED PELLETS ORAL at 21:39

## 2023-06-26 RX ADMIN — NICOTINE SCH PATCH: 14 PATCH, EXTENDED RELEASE TRANSDERMAL at 09:46
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
## 2023-06-27 RX ADMIN — DIVALPROEX SODIUM SCH: 125 CAPSULE, COATED PELLETS ORAL at 08:19

## 2023-06-27 RX ADMIN — DIVALPROEX SODIUM SCH MG: 125 CAPSULE, COATED PELLETS ORAL at 21:28

## 2023-06-27 RX ADMIN — NICOTINE SCH PATCH: 14 PATCH, EXTENDED RELEASE TRANSDERMAL at 08:19

## 2023-06-27 NOTE — P.PN
Progress Note - Text


Progress Note Date: 06/27/23





Interval History:


Patient was seen wandering the hallways and was directable and agreeable to 

speak with writer in the office.  Currently, the patient continues to display 

mood lability, pressured speech, and endorses grandiose delusions.  The patient 

continues to be fixated on discharge and attempts to bargain with this provider 

in order to be discharged earlier.  He states that he will take the medications 

if it means that he can leave and come back when he needs to.  The patient was 

informed that he is being held involuntarily and has been petitioned and 

certified for mental health treatment.  He is currently denying any suicidal or 

homicidal ideation, intention, and/or plan.  He is not reporting any auditory or

visual hallucinations.  He continues to be quite labile and grandiose and often 

make statements about how he is smarter than everyone in the unit.  He also has 

been refusing medications. 





Mental Status Exam:


General Appearance: Patient appears to be stated age is alert, difficult to 

direct and attempts to cooperate. 


Behavior: Displays elevated psychomotor activity.


Speech: Patient's speech is fluent and spontaneous.  Loud in volume.  Pressured.


Mood/Affect: Mood is "I'm ready to go home," affect is expansive and labile.


Suicidality/Homicidality:  Patient reports no suicidal or homicidal ideation, 

intention, and/or plan.


Perceptions: Patient denies any visual hallucinations and denies any auditory 

hallucinations  


Though content/process: Flight of ideas and loose association is evident.  

Grandiose.


Memory and concentration: Grossly poor at this time


Judgment and insight: Poor





                                   Vital Signs











Temp  97.9 F   06/27/23 07:12


 


Pulse  88   06/27/23 07:12


 


Resp  19   06/27/23 07:12


 


BP  191/75   06/27/23 07:12


 


Pulse Ox  92 L  06/27/23 07:12


 


FiO2      








                       Laboratory Results - Last 24 Hours











  06/26/23 06/26/23





  10:00 10:00


 


Estimated Ave Glu mg/dL   111


 


Hemoglobin A1c   5.5


 


Triglycerides  77.40 


 


Cholesterol  161.00 


 


LDL Cholesterol, Calc  97.0 


 


VLDL Cholesterol, Calc  15.48 


 


HDL Cholesterol  48.50 


 


Cholesterol/HDL Ratio  3.32 











Assessment


Bipolar 1 disorder, manic episode


Cannabis use disorder


Nicotine dependence


PTSD





Plan:


-Patient continues to meet criteria for inpatient psychiatric admission for 

symptom stabilization and safety.  The patient has been petitioned and 

certified.


-Medications: Patient has been nonadherent with his medications.  He states 

he'll take them today.


Continue Depakote 500 mg by mouth twice a day for mood stabilization


Continue Seroquel 100 mg daily at bedtime for mood stabilization


-When necessary Zyprexa and Vistaril for agitation/aggression.


-NRT - nicotine patch


-SW on board for discharge planning.  Encouraged the patient to participate in 

milieu.

## 2023-06-27 NOTE — P.CONS
History of Present Illness





- Reason for Consult


Consult date: 06/26/23


Medical evaluation





- Chief Complaint


Psych evaluation





- History of Present Illness





62-year-old male with history of stroke





Patient refused medical evaluation





ER chart review shows that patient come in for psych evaluation he was 

petitioned due to psychosis with flight of ideas and pressured speech he also 

conveyed some suicidal ideation the petition states that he was manic and was 

threatening to cut himself by using a gun to his head and that he does have 

access to





Review of systems unavailable





Physical exam unavailable patient declined





Assessment and plan


Suicidal ideation


Psychosis


Management per psych





Blood work reviewed showed


White count of 6.5 hemoglobin 16 both unremarkable


Renal function sodium 137 potassium 3.8 BUN 17 creatinine 0.88 unremarkable





Urine drug screen positive for meth and marijuana





Thank you for this consultation placed which at [facility] if patient is 

willing to discuss





Past Medical History


Past Medical History: CVA/TIA


Additional Past Medical History / Comment(s): States was at [facility] 06/17-18/2023 for TIA


History of Any Multi-Drug Resistant Organisms: None Reported


Past Surgical History: Orthopedic Surgery


Additional Past Surgical History / Comment(s): shoulder


Past Anesthesia/Blood Transfusion Reactions: Unable to Obtain


Past Psychological History: Anxiety, Bipolar, Depression, PTSD


Smoking Status: Current every day smoker


Past Alcohol Use History: Rare


Past Drug Use History: Marijuana


Additional Drug Use History / Comment(s): 06/25/2023 UDS +benzos (prescribed) 

and marijuana





Medications and Allergies


                                Home Medications











 Medication  Instructions  Recorded  Confirmed  Type


 


ALPRAZolam [Xanax] 0.25 mg PO TID PRN 06/25/23 06/26/23 History








                                    Allergies











Allergy/AdvReac Type Severity Reaction Status Date / Time


 


No Known Allergies Allergy   Verified 06/26/23 00:10














Physical Exam


Vitals: 


                                   Vital Signs











  Temp Pulse Resp BP Pulse Ox


 


 06/26/23 04:32  97.2 F L  61  15  169/76  96














Results


CBC & Chem 7: 


                                 06/25/23 18:20





                                 06/25/23 18:20


Labs: 


                  Abnormal Lab Results - Last 24 Hours (Table)











  06/26/23 Range/Units





  10:00 


 


Delta Bilirubin  0.3 H  (0.0-0.2)  mg/dL

## 2023-06-28 RX ADMIN — DIVALPROEX SODIUM SCH MG: 125 CAPSULE, COATED PELLETS ORAL at 08:23

## 2023-06-28 RX ADMIN — DIVALPROEX SODIUM SCH MG: 500 TABLET, DELAYED RELEASE ORAL at 21:14

## 2023-06-28 RX ADMIN — NICOTINE SCH PATCH: 14 PATCH, EXTENDED RELEASE TRANSDERMAL at 08:24

## 2023-06-28 NOTE — P.PN
Progress Note - Text


Progress Note Date: 06/28/23








Interval History:


Patient was seen wandering the hallways and was directable and agreeable to 

speak with writer in the office.  The patient continues to go on multiple 

tangents and present with pressured nonlinear speech.  His mood continues to be 

labile.  Despite this provider educating him on the process of inpatient 

psychiatric hospitalization, the patient constantly requires reeducation and 

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 

play for the patients."  He is not reporting any suicidal or homicidal ideation,

intention, and/or plan.  He is not reporting any auditory or visual 

hallucinations.  He denies any paranoia however does report some grandiosity.  

He has been refusing the Seroquel however states that he will take Abilify.  He 

is agreeable with the titration of his Depakote.





Mental Status Exam: Grossly unchanged from yesterday to


General Appearance: Patient appears to be stated age is alert, difficult to 

direct and attempts to cooperate. 


Behavior: Displays elevated psychomotor activity.


Speech: Patient's speech is fluent and spontaneous.  Loud in volume.  Pressured.

 More interruptible today.


Mood/Affect: Mood is "I need to go home," affect is expansive and labile.


Suicidality/Homicidality:  Patient reports no suicidal or homicidal ideation, 

intention, and/or plan.


Perceptions: Patient denies any visual hallucinations and denies any auditory 

hallucinations  


Though content/process: Flight of ideas and loose association is evident.  

Grandiose.


Memory and concentration: Grossly poor at this time


Judgment and insight: Poor





                                   Vital Signs











Temp  97.4 F L  06/28/23 06:55


 


Pulse  76   06/28/23 06:55


 


Resp  18   06/28/23 06:55


 


BP  172/80   06/28/23 06:55


 


Pulse Ox  92 L  06/27/23 07:12


 


FiO2      








                               Laboratory Results











WBC  6.5 k/uL (3.8-10.6)   06/25/23  18:20    


 


RBC  4.92 m/uL (4.30-5.90)   06/25/23  18:20    


 


Hgb  16.0 gm/dL (13.0-17.5)   06/25/23  18:20    


 


Hct  47.1 % (39.0-53.0)   06/25/23  18:20    


 


MCV  95.7 fL (80.0-100.0)   06/25/23  18:20    


 


MCH  32.6 pg (25.0-35.0)   06/25/23  18:20    


 


MCHC  34.0 g/dL (31.0-37.0)   06/25/23  18:20    


 


RDW  12.9 % (11.5-15.5)   06/25/23  18:20    


 


Plt Count  187 k/uL (150-450)   06/25/23  18:20    


 


MPV  7.9   06/25/23  18:20    


 


Neutrophils %  67 %  06/25/23  18:20    


 


Lymphocytes %  22 %  06/25/23  18:20    


 


Monocytes %  8 %  06/25/23  18:20    


 


Eosinophils %  1 %  06/25/23  18:20    


 


Basophils %  0 %  06/25/23  18:20    


 


Neutrophils #  4.4 k/uL (1.3-7.7)   06/25/23  18:20    


 


Lymphocytes #  1.4 k/uL (1.0-4.8)   06/25/23  18:20    


 


Monocytes #  0.5 k/uL (0-1.0)   06/25/23  18:20    


 


Eosinophils #  0.1 k/uL (0-0.7)   06/25/23  18:20    


 


Basophils #  0.0 k/uL (0-0.2)   06/25/23  18:20    


 


Sodium  137 mmol/L (137-145)   06/25/23  18:20    


 


Potassium  3.8 mmol/L (3.5-5.1)   06/25/23  18:20    


 


Chloride  107 mmol/L ()   06/25/23  18:20    


 


Carbon Dioxide  21 mmol/L (22-30)  L  06/25/23  18:20    


 


Anion Gap  9 mmol/L  06/25/23  18:20    


 


BUN  17 mg/dL (9-20)   06/25/23  18:20    


 


Creatinine  0.88 mg/dL (0.66-1.25)   06/25/23  18:20    


 


Est GFR (CKD-EPI)AfAm  >90  (>60 ml/min/1.73 sqM)   06/25/23  18:20    


 


Est GFR (CKD-EPI)NonAf  >90  (>60 ml/min/1.73 sqM)   06/25/23  18:20    


 


Glucose  107 mg/dL (74-99)  H  06/25/23  18:20    


 


Estimated Ave Glu mg/dL  111 mg/dL  06/26/23  10:00    


 


Hemoglobin A1c  5.5 % (<=6.0)   06/26/23  10:00    


 


Calcium  8.9 mg/dL (8.4-10.2)   06/25/23  18:20    


 


Total Bilirubin  1.3 mg/dL (0.2-1.3)   06/26/23  10:00    


 


Conjugated Bilirubin  0.0 mg/dL (0.0-0.3)   06/26/23  10:00    


 


Unconjugated Bilirubin  1.0 mg/dL (0.0-1.1)   06/26/23  10:00    


 


Delta Bilirubin  0.3 mg/dL (0.0-0.2)  H  06/26/23  10:00    


 


AST  43 U/L (17-59)   06/26/23  10:00    


 


ALT  34 U/L (4-49)   06/26/23  10:00    


 


Alkaline Phosphatase  81 U/L ()   06/26/23  10:00    


 


Total Protein  7.1 g/dL (6.3-8.2)   06/26/23  10:00    


 


Albumin  4.3 g/dL (3.5-5.0)   06/26/23  10:00    


 


Triglycerides  77.40 mg/dL (0..00)   06/26/23  10:00    


 


Cholesterol  161.00 mg/dL (0..00)   06/26/23  10:00    


 


LDL Cholesterol, Calc  97.0 mg/dL (0.0-131.0)   06/26/23  10:00    


 


VLDL Cholesterol, Calc  15.48 mg/dL (5.00-40.00)   06/26/23  10:00    


 


HDL Cholesterol  48.50 mg/dL (40.00-60.00)   06/26/23  10:00    


 


Cholesterol/HDL Ratio  3.32 Ratio  06/26/23  10:00    


 


TSH  0.879 mIU/L (0.465-4.680)   06/26/23  10:00    


 


Urine Color  Yellow   06/25/23  20:00    


 


Urine Appearance  Clear  (Clear)   06/25/23  20:00    


 


Urine pH  5.0  (5.0-8.0)   06/25/23  20:00    


 


Ur Specific Gravity  1.025  (1.001-1.035)   06/25/23  20:00    


 


Urine Protein  Trace  (Negative)  H  06/25/23  20:00    


 


Urine Glucose (UA)  Negative  (Negative)   06/25/23  20:00    


 


Urine Ketones  1+  (Negative)  H  06/25/23  20:00    


 


Urine Blood  Negative  (Negative)   06/25/23  20:00    


 


Urine Nitrite  Negative  (Negative)   06/25/23  20:00    


 


Urine Bilirubin  Negative  (Negative)   06/25/23  20:00    


 


Urine Urobilinogen  <2.0 mg/dL (<2.0)   06/25/23  20:00    


 


Ur Leukocyte Esterase  Negative  (Negative)   06/25/23  20:00    


 


Urine Opiates Screen  Not Detected  (NotDetected)   06/25/23  20:00    


 


Ur Oxycodone Screen  Not Detected  (NotDetected)   06/25/23  20:00    


 


Urine Methadone Screen  Not Detected  (NotDetected)   06/25/23  20:00    


 


Ur Propoxyphene Screen  Not Detected  (NotDetected)   06/25/23  20:00    


 


Ur Barbiturates Screen  Not Detected  (NotDetected)   06/25/23  20:00    


 


U Tricyclic Antidepress  Not Detected  (NotDetected)   06/25/23  20:00    


 


Ur Phencyclidine Scrn  Not Detected  (NotDetected)   06/25/23  20:00    


 


Ur Amphetamines Screen  Not Detected  (NotDetected)   06/25/23  20:00    


 


U Methamphetamines Scrn  Not Detected  (NotDetected)   06/25/23  20:00    


 


U Benzodiazepines Scrn  Detected  (NotDetected)  H  06/25/23  20:00    


 


Urine Cocaine Screen  Not Detected  (NotDetected)   06/25/23  20:00    


 


U Marijuana (THC) Screen  Detected  (NotDetected)  H  06/25/23  20:00    


 


Serum Alcohol  <10 mg/dL  06/25/23  18:20    


 


Coronavirus (PCR)  Not Detected  (Not Detectd)   06/25/23  19:32    














Assessment


Bipolar 1 disorder, manic episode


Cannabis use disorder


Nicotine dependence


PTSD





Plan:


-Patient continues to meet criteria for inpatient psychiatric admission for 

symptom stabilization and safety.  The patient has been petitioned and 

certified.  Patient deferred until the court.


-Medications: Patient has been nonadherent with his medications.  He states 

he'll take them today.


Increase Depakote to 500 mg in the morning and 1000 mg at bedtime for mood 

stabilization 


Start Abilify 10 mg by mouth at noon time for mood stabilization


-When necessary Zyprexa and Vistaril for agitation/aggression.


-NRT - nicotine patch


-SW on board for discharge planning.  Encouraged the patient to participate in 

milieu.

## 2023-06-29 RX ADMIN — NICOTINE SCH PATCH: 14 PATCH, EXTENDED RELEASE TRANSDERMAL at 08:22

## 2023-06-29 RX ADMIN — DIVALPROEX SODIUM SCH MG: 500 TABLET, DELAYED RELEASE ORAL at 20:52

## 2023-06-29 NOTE — P.PN
Subjective


Progress Note Date: 06/29/23


Principal diagnosis: 





Progress note


He was seen today in persona and was discussed at team meeting. He was highly 

agitated initially at the meeting in a heightened sense or urgency re; his Plan 

of [place] border to [facility] . He talked with pressure of speech over a 

variety of topics including his family history of psychiatric disorder; 

Alzheimer dementia . his feeling stressed out over his split citizeneship and 

birthplace and his caregiver burnt out.  He was confused over his birthplace; he

said he was born in [facility] ( [facility] in [facility]) ; in another [facility], he was entered as born in [facility]. He has full SSI benefits and has 

 status; He could not further explain why he plans to cross the border 

with no [place] passport to [facility] : he vaguely talked about his family was "buried "

in [facility]. later on he talked about how he provided care for his daughter and 

grandchild an dhis wife for many years perfroming multiple household chores. He 

was unaware that he was in the acute florid manic phase of his psychosis. MSE> 

he was highly agitated. with moderate pressrues of speech. thankfully me 

relentlessly for "favours" . affect. labile affect congruent with thought 

content. Exctied mixed with irritability , grandiose and papranoid delusions . 

No suicidal or homicidal ideations. No hallucinations. Cog. Oriented , insight 

and jdugment were absent. diagnsosi: schizoaffective Disorder Bipolar subtype, 

actue psychosis. woth comorbid cannabis and other substances abuse is highly 

likely even in the absence of drug screen. Street drugs of abuse would escape 

routine U/D screen.


Monitor his respnse. He contnue to fulfil the 2nd certificate of involuntary 

admission. Rx: his depakote woudl be increased to total daily dosage of 1000 mg 

po bid; abilify to be icnreased to 20 mg po he would be reasessed.





Objective





- Vital Signs


Vital signs: 


                                   Vital Signs











Temp  96.9 F L  06/29/23 07:04


 


Pulse  62   06/29/23 07:04


 


Resp  18   06/29/23 07:04


 


BP  176/76   06/29/23 07:04


 


Pulse Ox  92 L  06/27/23 07:12


 


FiO2      














- Labs


CBC & Chem 7: 


                                 06/25/23 18:20





                                 06/25/23 18:20

## 2023-06-30 RX ADMIN — NICOTINE SCH PATCH: 14 PATCH, EXTENDED RELEASE TRANSDERMAL at 08:14

## 2023-06-30 RX ADMIN — DIVALPROEX SODIUM SCH MG: 500 TABLET, DELAYED RELEASE ORAL at 20:36

## 2023-06-30 NOTE — P.PN
Subjective


Progress Note Date: 06/30/23


Principal diagnosis: 





progress note 


He was seen today for review of his progress. and was dsicussed at the team 

meeting. He showed soem improvement after his antipsychotic rX was titrrated 

upwards. His Deparkote was increased to  1000 mg po bid wtih no GI complaint. He

was not drowsy ; hi spresures of speech flight of ideas, episodic excitent over 

finding the solution to his [place] pare[place] root and scheduling trip have 

been better under control after his zyprexa was increased to total daily dosage 

of 25 mg po . He tolerated the high dosage with no EPS. No akathisia. today he 

tolerated the session lasting for at least 20 minutes. I asked him to clarify 

his family situation. He talked about his father successful suicidal attempt and

he witnessed the self-finflicted shooting. The traumatic stress has resurfaced 

again in his manic state. He furthe ruminated over his birthplace and [facility] to

have [place] birth certificate that he was born in [facility] and NOT In [place]. He has an unusual urgency to visit [place] with a free trip offered by 

his cousin. However, he later stared he has Not met her for quite a no of years 

. he later on [facility] he would have to stay in [facility] for a minimunm of 90 

days for his residence. He planned to have a [place] passport and a [facility] care afte rhis long awaited [place] trip.  However, he was aware of the 

legal barrier: he inquired about the probate court. I reinfroced in him the 

conditions of the probabte for him to seek treatment. He  seemd to be further 

alienated from his family and ruiminated over his burnt out. When aksed about 

his financial situation, he psesented a mxied picture. 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 on the 

contrrary, he held he would have better [facility] beneftis from [employer] and [facility] and family members to 

approach various ministries for urgent assistance.   the grandisity and impaied 

realty testing was markedly evident. 


Hmse; He was lucid, cohrent with mild presures of speech. affect: excited at 

times with minimal agitation > labile affect ; mild. Congrueent with thought 

content. Grandiose ideas of reference was evident;  No hallucinations : no 

suicidal or homicidal ideation was evident.  Throught process; Circumstantial. 

query inflated account and distorted data. on his personal legal and financial 

status. Cog; Oriented. insight and judgment was marginal


Diagnosis; Bipolar disorder, most recent manic phase with psychotic features. 

query substance use disroder. to be examined. 


management; he continued to fulfil the criteria of inpatinet stay. Rx would 

continue wtih the goal of staring him on [place] in addition to Depakote. probate 

court hearing to be scheduled. legal information re; [place] immigration and 

birthplace woudl have to be clarified via legal professional . Explore 

dysfunctional family dynamics.  Unit SW consulation for follow up linkage to [facility] at team meeting 





Objective





- Vital Signs


Vital signs: 


                                   Vital Signs











Temp  96.7 F L  06/30/23 06:27


 


Pulse  64   06/30/23 06:27


 


Resp  16   06/30/23 06:27


 


BP  139/72   06/30/23 06:27


 


Pulse Ox  92 L  06/27/23 07:12


 


FiO2      














- Labs


CBC & Chem 7: 


                                 06/25/23 18:20





                                 06/25/23 18:20

## 2023-07-01 RX ADMIN — NICOTINE SCH PATCH: 14 PATCH, EXTENDED RELEASE TRANSDERMAL at 08:21

## 2023-07-01 RX ADMIN — DIVALPROEX SODIUM SCH MG: 500 TABLET, DELAYED RELEASE ORAL at 20:06

## 2023-07-01 NOTE — P.PN
Subjective


Progress Note Date: 07/01/23


Principal diagnosis: 





Diagnosis; Bipolar disorder, most recent manic phase with psychotic features.





Subjective data:


Patient with extremely polite and cooperative


Patient reports that he's been hospitalized against his will because his son is 

impatient to get some help for him


He states that he was due for a physical examination and that is something we do

not want to wait


He stated that he was just tired and dehydrated from taking care of his wife who

is mentally ill and diabetic


He says that he does most of the house overwork


He states that he was also subject to ECT treatments when he was 4 years old and

that he just found out about the last week


He says that he is hoping that he would be able to go home soon


He says that he is gone through a traumatic childhood where his father committed

suicide when he was 4 years old in front of him and shot himself


He says that he sometimes had nightmares that his coping





Mental status examination:


MENTAL STATUS EXAM: 


General Appearance: This 19-year-old looking his age


Patient reports that he was 420 pounds before unhealthy and that now he is half 

the size and unhealthy


Behavior: Patient is seated without any agitated behavior.  Not responding to 

any internal stimuli


Speech: Patient's speech is [fluent and nonpressured.]  Hesitant.


Mood/Affect: Patient reports their mood is "okay", affect is congruent and 

constricted. 


Suicidality/Homicidality:  Patient denies having any homicidal ideation intent 

or plan. [Denies any suicidal ideations intent or plan]  


Perceptions: Patient denies any visual hallucinations [and denies any auditory 

hallucinations]


Though content/process: Multiple loosely formed delusions, loose associations, 

illogical.  


Memory and concentration: Alert and oriented to time place and person co

ncentration and attention span are fair


Judgment and insight: [poor]








Plan:


he continued to fulfil the criteria of inpatinet stay. 


Rx would continue with the goal of staring him on [place] in addition to Depakote.

 probate court hearing to be scheduled.


. Explore dysfunctional family dynamics. 


Ativan and haldol PRN for agitation/aggression


-Patient was informed of the risks, benefits and side effects of the medication 

and patient verbally consented to taking the medications. Patient signed med c

onsent form and was placed in chart.


- on board for discharge planning. 


Encourage patient to participate in groups to work on coping skills.





Michael Alex M.D.


7/1/23





Objective





- Vital Signs


Vital signs: 


                                   Vital Signs











Temp  98.2 F   07/01/23 07:01


 


Pulse  58 L  07/01/23 07:01


 


Resp  16   07/01/23 07:01


 


BP  162/72   07/01/23 07:01


 


Pulse Ox  98   07/01/23 07:01


 


FiO2      














- Labs


CBC & Chem 7: 


                                 06/25/23 18:20





                                 06/25/23 18:20

## 2023-07-02 RX ADMIN — DIVALPROEX SODIUM SCH MG: 500 TABLET, DELAYED RELEASE ORAL at 21:06

## 2023-07-02 RX ADMIN — NICOTINE SCH PATCH: 14 PATCH, EXTENDED RELEASE TRANSDERMAL at 08:38

## 2023-07-02 NOTE — P.PN
Subjective


Progress Note Date: 07/02/23


Principal diagnosis: 





Diagnosis; Bipolar disorder, most recent manic phase with psychotic features.





Subjective data:


Patient with extremely polite and cooperative


Patient reports that he is doing well


He says that he is not having any other issues or concerns


He states that he would like to have his afternoon dose of Abilify changed to 3 

PM


He denies otherwise any other issues or concerns








Mental status examination:


MENTAL STATUS EXAM: 


General Appearance: This 19-year-old looking his age


Patient reports that he was 420 pounds before unhealthy and that now he is half 

the size and unhealthy


Behavior: Patient is seated without any agitated behavior.  Not responding to 

any internal stimuli


Speech: Patient's speech is [fluent and nonpressured.]  Hesitant.


Mood/Affect: Patient reports their mood is "okay", affect is congruent and 

constricted. 


Suicidality/Homicidality:  Patient denies having any homicidal ideation intent 

or plan. [Denies any suicidal ideations intent or plan]  


Perceptions: Patient denies any visual hallucinations [and denies any auditory 

hallucinations]


Though content/process: Multiple loosely formed delusions, loose associations, 

illogical.  


Memory and concentration: Alert and oriented to time place and person 

concentration and attention span are fair


Judgment and insight: Some improvement noted








Plan:


he continued to fulfil the criteria of inpatinet stay. 


Rx would continue with the goal of staring him on [place] in addition to Depakote.

probate court hearing to be scheduled.


. Explore dysfunctional family dynamics. 


Ativan and haldol PRN for agitation/aggression


-Patient was informed of the risks, benefits and side effects of the medication 

and patient verbally consented to taking the medications. Patient signed med 

consent form and was placed in chart.


- on board for discharge planning. 


Encourage patient to participate in groups to work on coping skills.





Michael Alex M.D.


7/2/23





Objective





- Vital Signs


Vital signs: 


                                   Vital Signs











Temp  97.4 F L  07/02/23 07:01


 


Pulse  68   07/02/23 07:01


 


Resp  14   07/02/23 07:01


 


BP  175/83   07/02/23 07:01


 


Pulse Ox  98   07/01/23 07:01


 


FiO2      








                                 Intake & Output











 07/01/23 07/02/23 07/02/23





 18:59 06:59 18:59


 


Weight   97.1 kg














- Labs


CBC & Chem 7: 


                                 06/25/23 18:20





                                 06/25/23 18:20

## 2023-07-03 VITALS
DIASTOLIC BLOOD PRESSURE: 72 MMHG | SYSTOLIC BLOOD PRESSURE: 176 MMHG | TEMPERATURE: 97.1 F | HEART RATE: 54 BPM | RESPIRATION RATE: 16 BRPM

## 2023-07-03 RX ADMIN — NICOTINE SCH PATCH: 14 PATCH, EXTENDED RELEASE TRANSDERMAL at 08:27
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
